# Patient Record
Sex: MALE | Race: OTHER | HISPANIC OR LATINO | URBAN - METROPOLITAN AREA
[De-identification: names, ages, dates, MRNs, and addresses within clinical notes are randomized per-mention and may not be internally consistent; named-entity substitution may affect disease eponyms.]

---

## 2023-09-07 ENCOUNTER — EMERGENCY (EMERGENCY)
Facility: HOSPITAL | Age: 58
LOS: 0 days | Discharge: HOME | End: 2023-09-07
Attending: STUDENT IN AN ORGANIZED HEALTH CARE EDUCATION/TRAINING PROGRAM
Payer: COMMERCIAL

## 2023-09-07 VITALS
RESPIRATION RATE: 18 BRPM | HEIGHT: 71 IN | TEMPERATURE: 99 F | DIASTOLIC BLOOD PRESSURE: 68 MMHG | HEART RATE: 95 BPM | SYSTOLIC BLOOD PRESSURE: 125 MMHG | OXYGEN SATURATION: 99 % | WEIGHT: 195.11 LBS
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
## 2023-09-07 DIAGNOSIS — T25.222A BURN OF SECOND DEGREE OF LEFT FOOT, INITIAL ENCOUNTER: ICD-10-CM
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
## 2023-09-07 DIAGNOSIS — T31.0 BURNS INVOLVING LESS THAN 10% OF BODY SURFACE: ICD-10-CM

## 2023-09-07 PROCEDURE — 99284 EMERGENCY DEPT VISIT MOD MDM: CPT

## 2023-09-07 PROCEDURE — 99285 EMERGENCY DEPT VISIT HI MDM: CPT | Mod: 25

## 2023-09-07 PROCEDURE — 16020 DRESS/DEBRID P-THICK BURN S: CPT

## 2023-09-07 RX ORDER — TETANUS TOXOID, REDUCED DIPHTHERIA TOXOID AND ACELLULAR PERTUSSIS VACCINE, ADSORBED 5; 2.5; 8; 8; 2.5 [IU]/.5ML; [IU]/.5ML; UG/.5ML; UG/.5ML; UG/.5ML
0.5 SUSPENSION INTRAMUSCULAR ONCE
Refills: 0 | Status: DISCONTINUED | OUTPATIENT
Start: 2023-09-07 | End: 2023-09-07

## 2023-09-07 RX ORDER — IBUPROFEN 200 MG
1 TABLET ORAL
Qty: 21 | Refills: 0
Start: 2023-09-07 | End: 2023-09-13

## 2023-09-07 RX ADMIN — Medication 1 APPLICATION(S): at 22:18

## 2023-09-07 NOTE — ED PROVIDER NOTE - OBJECTIVE STATEMENT
58M no notable PMHx coming in for burn. Pt reports that he spilled an unknown liq and burnt his left foot  x1 week ago at work, was with pain for this past week, but today worsened and bubbled up. Went to  who gave him toradol and told him to come into ED. 58M no notable PMHx coming in for burn. Pt reports that he spilled an unknown liq and burnt his left foot  x1 week ago at work, was with pain for this past week, but today worsened and bubbled up. Went to  who gave him toradol and told him to come into ED. Last tetanus 2 years ago.

## 2023-09-07 NOTE — ED PROVIDER NOTE - CLINICAL SUMMARY MEDICAL DECISION MAKING FREE TEXT BOX
59 yo M p/w second degree burn to the left plantar surface of foot 1 week prior to presentation. noted with blister, no evidence of infection. burn consult appreciated. abx sent to pharmacy. wound dressed by burn. return precautions discussed with patient.

## 2023-09-07 NOTE — CONSULT NOTE ADULT - ASSESSMENT
Left foot burn from unknown cleaning product x 1 week ago    RECOMMENDATION:  Wound care - wash with soap and water. apply silvadene, adaptic, kerlix. ACE wrap from toes to below knees for compression.   Elevate foot when at rest   Augmentin x 7 days   Call [phone] to make burn clinic appt within 1 week of discharge  Both son and patient educated on wound care and signs when to return to ED.

## 2023-09-07 NOTE — ED PROVIDER NOTE - PHYSICAL EXAMINATION
CONSTITUTIONAL: NAD  HEAD: NCAT  EYES: NL inspection  EXT: left plantar foot with some swelling , tense and tender at site of location, neurovascularly intact, FROM  NEURO: Grossly unremarkable  PSYCH: Cooperative, appropriate.

## 2023-09-07 NOTE — ED PROVIDER NOTE - PATIENT PORTAL LINK FT
You can access the FollowMyHealth Patient Portal offered by [facility] by registering at the following website: [email or web address]. By joining [facility]’s FollowMyHealth portal, you will also be able to view your health information using other applications (apps) compatible with our system.

## 2023-09-07 NOTE — ED PROVIDER NOTE - NSFOLLOWUPINSTRUCTIONS_ED_ALL_ED_FT
Please follow-up with PCP in 1 to 3 days. Return precautions explained in full to patient/family.    Burn    A burn is an injury to your skin or the tissues under your skin usually caused by heat or caustic chemicals. In severe cases, a burn can damage the muscles and bones under the skin. There are three different degrees of burns: first (mild), second, and third (severe). Make sure to use any prescribed ointments as directed. If you were prescribed antibiotic medicine, take it as told by your health care provider. Do not stop using the antibiotic even if your condition improves. Follow up is available at the burn clinic.    SEEK IMMEDIATE MEDICAL CARE IF YOU HAVE ANY OF THE FOLLOWING SYMPTOMS: red streaks near the burn, severe pain, or fever. Please follow-up with burn on Tuesday. Please call [phone] to set up the Burn Appointment in [place]. Please take antibiotics and pain meds as prescribed. Return precautions explained in full to patient/family.    Burn    A burn is an injury to your skin or the tissues under your skin usually caused by heat or caustic chemicals. In severe cases, a burn can damage the muscles and bones under the skin. There are three different degrees of burns: first (mild), second, and third (severe). Make sure to use any prescribed ointments as directed. If you were prescribed antibiotic medicine, take it as told by your health care provider. Do not stop using the antibiotic even if your condition improves. Follow up is available at the burn clinic.    SEEK IMMEDIATE MEDICAL CARE IF YOU HAVE ANY OF THE FOLLOWING SYMPTOMS: red streaks near the burn, severe pain, or fever.

## 2023-09-07 NOTE — CONSULT NOTE ADULT - SUBJECTIVE AND OBJECTIVE BOX
Patient is a 59yo male with no pmh who presents to ED complaining of burn to left foot. Patient is accompanied by his son who is translating to the patient in [place]. 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 Some of the cleaning product he used got onto his left foot and into his shoe; causing a burn to the bottom of his foot. Patient continued to work that day. When he went home he saw his foot was burned and it was painful. He was given a cream to put on it by a friend (unsure what the cream was) and was wrapping it. Patient has continued to work throughout the week , but today he noticed there was a big blister on the bottom of his foot and the pain was worse today because he was working on his feet a lot today. Patient denies fever, chills, n/v/d, SOB, chest pain.       Allergies    No Known Allergies          PAST MEDICAL & SURGICAL HISTORY:  Denies          Exam:  Gen: well developed, well nourished appearing male, NAD , sitting on exam table comfortably   Neuro: awake, alert, oriented, speaking in full sentences to his son in [place]   MSK: FROM to BUE and BLE  SKin:   Left foot: plantar aspect of mediolateral foot with thick fluid filled blister. Blister debrided exposing ~5x5cm area of pink, moist healthy appearing tissue , + thick serous fluid , mild tenderness. Digit # 5 , plantar aspect with dry partial thickness burn. No erythema, no purulence. + edema

## 2023-09-07 NOTE — ED PROVIDER NOTE - MDM ORDERS SUBMITTED SELECTION
S/w  at Dr. Tinaa Silva office who refused to take any information over the phone. Requested our office fax all requests to [phone] or [phone]. Faxed request to hold Eliquis to fax number above. Medications